# Patient Record
Sex: MALE | Race: ASIAN
[De-identification: names, ages, dates, MRNs, and addresses within clinical notes are randomized per-mention and may not be internally consistent; named-entity substitution may affect disease eponyms.]

---

## 2018-05-17 ENCOUNTER — HOSPITAL ENCOUNTER (OUTPATIENT)
Dept: HOSPITAL 45 - C.RAD1850 | Age: 23
Discharge: HOME | End: 2018-05-17
Attending: PHYSICIAN ASSISTANT
Payer: COMMERCIAL

## 2018-05-17 DIAGNOSIS — R53.83: Primary | ICD-10-CM

## 2018-05-17 DIAGNOSIS — R06.83: ICD-10-CM

## 2018-05-17 LAB — TRANSFERRIN SERPL-MCNC: 280 MG/DL (ref 200–360)

## 2018-05-17 NOTE — DIAGNOSTIC IMAGING REPORT
CHEST 2 VIEWS ROUTINE



CLINICAL HISTORY: R06.83 [id number]    



COMPARISON STUDY:  No previous studies for comparison.



FINDINGS: The cardiac and mediastinal contours are normal. There is no evidence

of focal pulmonary consolidation. There is no evidence of failure. No pleural

effusions are visualized.[ 



IMPRESSION: No active disease in the chest.







Electronically signed by:  Cameron Bonner M.D.

5/17/2018 11:44 AM



Dictated Date/Time:  5/17/2018 11:44 AM